# Patient Record
Sex: MALE | Race: WHITE | NOT HISPANIC OR LATINO | URBAN - METROPOLITAN AREA
[De-identification: names, ages, dates, MRNs, and addresses within clinical notes are randomized per-mention and may not be internally consistent; named-entity substitution may affect disease eponyms.]

---

## 2024-08-12 ENCOUNTER — OFFICE VISIT (OUTPATIENT)
Dept: CARDIOLOGY | Facility: CLINIC | Age: 79
End: 2024-08-12
Payer: MEDICARE

## 2024-08-12 VITALS
DIASTOLIC BLOOD PRESSURE: 88 MMHG | SYSTOLIC BLOOD PRESSURE: 122 MMHG | BODY MASS INDEX: 28.79 KG/M2 | HEART RATE: 83 BPM | WEIGHT: 190 LBS | HEIGHT: 68 IN | OXYGEN SATURATION: 95 %

## 2024-08-12 DIAGNOSIS — Z95.1 S/P CABG (CORONARY ARTERY BYPASS GRAFT): ICD-10-CM

## 2024-08-12 DIAGNOSIS — Z01.810 PREOP CARDIOVASCULAR EXAM: Primary | ICD-10-CM

## 2024-08-12 DIAGNOSIS — E78.5 HYPERLIPIDEMIA LDL GOAL <70: ICD-10-CM

## 2024-08-12 PROCEDURE — 1159F MED LIST DOCD IN RCRD: CPT | Performed by: STUDENT IN AN ORGANIZED HEALTH CARE EDUCATION/TRAINING PROGRAM

## 2024-08-12 PROCEDURE — 93000 ELECTROCARDIOGRAM COMPLETE: CPT | Performed by: STUDENT IN AN ORGANIZED HEALTH CARE EDUCATION/TRAINING PROGRAM

## 2024-08-12 PROCEDURE — 1160F RVW MEDS BY RX/DR IN RCRD: CPT | Performed by: STUDENT IN AN ORGANIZED HEALTH CARE EDUCATION/TRAINING PROGRAM

## 2024-08-12 PROCEDURE — 99204 OFFICE O/P NEW MOD 45 MIN: CPT | Performed by: STUDENT IN AN ORGANIZED HEALTH CARE EDUCATION/TRAINING PROGRAM

## 2024-08-12 RX ORDER — ALBUTEROL SULFATE 90 UG/1
1 AEROSOL, METERED RESPIRATORY (INHALATION)
COMMUNITY

## 2024-08-12 RX ORDER — FINASTERIDE 5 MG/1
1 TABLET, FILM COATED ORAL DAILY
COMMUNITY

## 2024-08-12 RX ORDER — BUDESONIDE AND FORMOTEROL FUMARATE DIHYDRATE 160; 4.5 UG/1; UG/1
2 AEROSOL RESPIRATORY (INHALATION)
COMMUNITY

## 2024-08-12 RX ORDER — ROSUVASTATIN CALCIUM 10 MG/1
10 TABLET, COATED ORAL DAILY
Qty: 30 TABLET | Refills: 11 | Status: SHIPPED | OUTPATIENT
Start: 2024-08-12

## 2024-08-12 RX ORDER — CEPHALEXIN 500 MG/1
500 CAPSULE ORAL 2 TIMES DAILY
COMMUNITY
Start: 2024-05-22

## 2024-08-12 RX ORDER — ALBUTEROL SULFATE 2.5 MG/3ML
2.5 SOLUTION RESPIRATORY (INHALATION)
COMMUNITY
Start: 2024-04-15

## 2024-08-12 NOTE — PROGRESS NOTES
"      [facility]    Subjective:     Encounter Date:08/12/24      Patient ID: Allan Hills is a 78 y.o. male.    Chief Complaint:   Chief Complaint   Patient presents with    Establish Care     Patient is in the office today to establish care and obtain an Preoperative clearance.       History of Present Illness    Allan Hills is a 78-year-old gentleman who presents for a preoperative cardiovascular risk assessment.  His PCP is Dr. Richardson.  He has a history of bladder cancer and is undergoing upcoming urologic procedures.  He also has a history of COPD as well as coronary artery disease with a suggested history of prior bypass surgery with a median sternotomy.  His records state thatThis was done in the early 2000's.  He has a history of a 5.3 cm abdominal aortic aneurysm which she has declined further surgical or evaluations for.    He does report that he has an advanced directive and is DNR.  He has \"made peace with his medical conditions\" and has \"lived a good life\".  He has denied further surgical evaluations of his AAA, and he knows that between his bladder cancer, his known heart disease, and his AAA \"something will get him.\"    He denies any chest pains, he does get short of breath when he walks.  This has been relatively stable over the last few years.  He has undergone a few different TURP procedures but he presents today for cardiac evaluation for similar procedure, as preop testing revealedAn abnormal ECG.  I do not see any prior ECGs prior to February 2024, although he did have his heart surgery at [facility] in early 2000's.    I reviewed his I reviewed his previous records from his PCP Dr. Richardson.  I reviewed his lipid panel and his TSH from July 2022 which appear to be the most recent records I have available.  His A1c was 5.7, his TSH was normal 1.2, creatinine 1.01, potassium 4.5, platelets 282, , HDL 95    He quit smoking in 2015 after his COPD diagnosis.    Previous " "Cardiac Testing:  [facility] July 2021: EF 61%, normal RV, aortic sclerosis without stenosis.    The following portions of the patient's history were reviewed and updated as appropriate: allergies, current medications, past family history, past medical history, past social history, past surgical history and problem list.    No past medical history on file.    No past surgical history on file.        ECG 12 Lead    Date/Time: 8/12/2024 11:17 AM  Performed by: Zeyad Grande MD    Authorized by: Zeyad Grande MD  Comparison: compared with previous ECG from 2/28/2024  Similar to previous ECG  Rhythm: sinus rhythm  Rate: normal  T inversion: V3 and V2  T flattening: V4  QRS axis: normal  Other findings: non-specific ST-T wave changes and early transition    Clinical impression: non-specific ECG           Objective:     Vitals:    08/12/24 1105   BP: 122/88   BP Location: Left arm   Patient Position: Sitting   Pulse: 83   SpO2: 95%   Weight: 86.2 kg (190 lb)   Height: 172.7 cm (68\")         Constitutional:       Appearance: Healthy appearance. Not in distress.   Neck:      Vascular: JVD normal.   Pulmonary:      Effort: Pulmonary effort is normal.      Comments: Poor air movement diffusely with scant expiratory wheezes.  No rales.  Cardiovascular:      PMI at left midclavicular line. Normal rate. Regular rhythm. Normal S2.       Murmurs: There is no murmur.   Pulses:     Intact distal pulses.   Edema:     Peripheral edema present.     Ankle: bilateral trace edema of the ankle.  Skin:     General: Skin is warm and dry.   Neurological:      General: No focal deficit present.      Mental Status: Alert, oriented to person, place, and time and oriented to person, place and time.   Psychiatric:         Mood and Affect: Mood and affect normal.         Lab Review:       BUN   Date Value Ref Range Status   01/04/2023 11 8 - 26 mg/dL Final     Creatinine   Date Value Ref Range Status   01/04/2023 1.10 0.73 - 1.18 mg/dL " "Final     Potassium   Date Value Ref Range Status   01/04/2023 3.9 3.5 - 5.1 mmol/L Final     ALT (SGPT)   Date Value Ref Range Status   07/26/2021 15 0 - 55 U/L Final     AST (SGOT)   Date Value Ref Range Status   07/26/2021 10 5 - 34 U/L Final         Performed        Assessment:          Diagnosis Plan   1. Preop cardiovascular exam               Plan:         AAA: The patient has declined further evaluation and monitoring per his PCP.  Most recently was 5.3 cm.  Preoperative cardiovascular assessment for upcoming TURP: The patient already had a procedure similar to this in February of this year, as well as October of last year.  He is scheduled for another procedure on September 18.  This is a relatively low risk procedure for preoperative M ACE, and he does not meet guideline recommendations for any additional cardiac testing for this.  That being said, given his risk factors including prior CABG, vascular risk factors, he is probably at moderate risk for preoperative M ACE, however this is not modifiable.  I do think an echocardiogram is reasonable, I do not see this bearing much on his surgical risk, however has been a few years since his last echo, he does have some trace pitting edema of his legs and you could argue that any findings of CHF can be treated medically which would help his quality of life which is his main interest.  He has continued to decline a lot of invasive testing and surgeries including AAA repair or additional cardiac procedures I do not see the role for stress testing at this time, we discussed medical therapy for certain conditions and I think that an echocardiogram here is reasonable.  Hyperlipidemia: Patient states that he had \"trouble\" with statin medications before but does not ever recall being on Crestor.  Will start Crestor 10 and take it at night, and then decrease to every other day if he has symptoms of myalgias.  I do think that this is highly warranted here given his " previous history of bypass surgery as well as his abdominal aneurysm  Papillary urothelial carcinoma.  He follows with urology.  Trace ankle pitting edema.  Checking the echo per above.  Probably venous insufficiency related.  No rales on exam to suggest CHF      Thank you for allowing me to participate in the care of Allan Hills. Feel free to contact me directly with any further questions or concerns.    RTC 1 year for preventative therapy reassessment, sooner if the echo shows any significant abnormalities.    Zeyad Grande MD  [facility]  08/12/24  08:59 EDT       Current Outpatient Medications:     albuterol (PROVENTIL) (2.5 MG/3ML) 0.083% nebulizer solution, Take 2.5 mg by nebulization 4 (Four) Times a Day., Disp: , Rfl:     albuterol sulfate  (90 Base) MCG/ACT inhaler, Inhale 1 puff 4 (Four) Times a Day., Disp: , Rfl:     budesonide-formoterol (SYMBICORT) 160-4.5 MCG/ACT inhaler, Inhale 2 puffs 2 (Two) Times a Day., Disp: , Rfl:     cephalexin (KEFLEX) 500 MG capsule, Take 1 capsule by mouth 2 (Two) Times a Day., Disp: , Rfl:     finasteride (PROSCAR) 5 MG tablet, Take 1 tablet by mouth Daily., Disp: , Rfl:          No follow-ups on file.      Part of this note may be an electronic transcription/translation of spoken language to printed text using the Dragon Dictation System.

## 2024-08-12 NOTE — PATIENT INSTRUCTIONS
Lets start the rosuvastatin 10mg, at night. If you have cramping, try to take it every other day, some is better than none as it reduces vascular inflammation

## 2024-08-28 ENCOUNTER — HOSPITAL ENCOUNTER (OUTPATIENT)
Dept: CARDIOLOGY | Facility: HOSPITAL | Age: 79
Discharge: HOME | End: 2024-08-28
Admitting: STUDENT IN AN ORGANIZED HEALTH CARE EDUCATION/TRAINING PROGRAM
Payer: MEDICARE

## 2024-08-28 VITALS
WEIGHT: 190 LBS | BODY MASS INDEX: 28.79 KG/M2 | SYSTOLIC BLOOD PRESSURE: 104 MMHG | DIASTOLIC BLOOD PRESSURE: 68 MMHG | HEIGHT: 68 IN

## 2024-08-28 DIAGNOSIS — Z95.1 S/P CABG (CORONARY ARTERY BYPASS GRAFT): ICD-10-CM

## 2024-08-28 DIAGNOSIS — Z01.810 PREOP CARDIOVASCULAR EXAM: ICD-10-CM

## 2024-08-28 LAB
AORTIC DIMENSIONLESS INDEX: 0.7 (DI)
BH CV ECHO MEAS - ACS: 1.45 CM
BH CV ECHO MEAS - AI P1/2T: 892.1 MSEC
BH CV ECHO MEAS - AO MAX PG: 5.8 MMHG
BH CV ECHO MEAS - AO MEAN PG: 3.3 MMHG
BH CV ECHO MEAS - AO ROOT DIAM: 4 CM
BH CV ECHO MEAS - AO V2 MAX: 120 CM/SEC
BH CV ECHO MEAS - AO V2 VTI: 24.8 CM
BH CV ECHO MEAS - AVA(I,D): 3.2 CM2
BH CV ECHO MEAS - EDV(CUBED): 50 ML
BH CV ECHO MEAS - EDV(MOD-SP2): 54 ML
BH CV ECHO MEAS - EDV(MOD-SP4): 57 ML
BH CV ECHO MEAS - EF(MOD-BP): 58.7 %
BH CV ECHO MEAS - EF(MOD-SP2): 57.4 %
BH CV ECHO MEAS - EF(MOD-SP4): 59.6 %
BH CV ECHO MEAS - ESV(CUBED): 19.3 ML
BH CV ECHO MEAS - ESV(MOD-SP2): 23 ML
BH CV ECHO MEAS - ESV(MOD-SP4): 23 ML
BH CV ECHO MEAS - FS: 27.2 %
BH CV ECHO MEAS - IVS/LVPW: 0.97 CM
BH CV ECHO MEAS - IVSD: 0.98 CM
BH CV ECHO MEAS - LAT PEAK E' VEL: 7.4 CM/SEC
BH CV ECHO MEAS - LV MASS(C)D: 111.4 GRAMS
BH CV ECHO MEAS - LV MAX PG: 3.7 MMHG
BH CV ECHO MEAS - LV MEAN PG: 1.94 MMHG
BH CV ECHO MEAS - LV V1 MAX: 95.7 CM/SEC
BH CV ECHO MEAS - LV V1 VTI: 17.8 CM
BH CV ECHO MEAS - LVIDD: 3.7 CM
BH CV ECHO MEAS - LVIDS: 2.7 CM
BH CV ECHO MEAS - LVOT AREA: 4.4 CM2
BH CV ECHO MEAS - LVOT DIAM: 2.37 CM
BH CV ECHO MEAS - LVPWD: 1.01 CM
BH CV ECHO MEAS - MED PEAK E' VEL: 5.2 CM/SEC
BH CV ECHO MEAS - MV A DUR: 0.1 SEC
BH CV ECHO MEAS - MV A MAX VEL: 76.6 CM/SEC
BH CV ECHO MEAS - MV DEC SLOPE: 160.3 CM/SEC2
BH CV ECHO MEAS - MV DEC TIME: 0.28 SEC
BH CV ECHO MEAS - MV E MAX VEL: 47.6 CM/SEC
BH CV ECHO MEAS - MV E/A: 0.62
BH CV ECHO MEAS - MV MAX PG: 2.9 MMHG
BH CV ECHO MEAS - MV MEAN PG: 1 MMHG
BH CV ECHO MEAS - MV P1/2T: 101.2 MSEC
BH CV ECHO MEAS - MV V2 VTI: 20.2 CM
BH CV ECHO MEAS - MVA(P1/2T): 2.17 CM2
BH CV ECHO MEAS - MVA(VTI): 3.9 CM2
BH CV ECHO MEAS - PA ACC TIME: 0.11 SEC
BH CV ECHO MEAS - PA V2 MAX: 106.9 CM/SEC
BH CV ECHO MEAS - RAP SYSTOLE: 3 MMHG
BH CV ECHO MEAS - RV MAX PG: 2.8 MMHG
BH CV ECHO MEAS - RV V1 MAX: 83.1 CM/SEC
BH CV ECHO MEAS - RV V1 VTI: 15.9 CM
BH CV ECHO MEAS - SV(LVOT): 78.4 ML
BH CV ECHO MEAS - SV(MOD-SP2): 31 ML
BH CV ECHO MEAS - SV(MOD-SP4): 34 ML
BH CV ECHO MEAS - TAPSE (>1.6): 1.47 CM
BH CV ECHO MEASUREMENTS AVERAGE E/E' RATIO: 7.56
BH CV XLRA - RV BASE: 3.1 CM
BH CV XLRA - RV LENGTH: 6.2 CM
BH CV XLRA - RV MID: 1.94 CM
BH CV XLRA - TDI S': 8.9 CM/SEC
LEFT ATRIUM VOLUME INDEX: 16.9 ML/M2
SINUS: 3.1 CM
STJ: 2.8 CM

## 2024-08-28 PROCEDURE — 93306 TTE W/DOPPLER COMPLETE: CPT

## 2024-08-28 NOTE — PROGRESS NOTES
Please let patient know that his ultrasound of his heart rate does not show any findings of heart failure.  There is a mild valve leak of one of his heart valves, likely related to age, but this is not going to cause any symptoms, nor will it probably cause any problems in the future.    Can we follow-up with patient and see if he is able to take that Crestor medication?  We had discussed this in clinic.  Thank you very much.

## 2024-08-29 ENCOUNTER — TELEPHONE (OUTPATIENT)
Dept: CARDIOLOGY | Facility: CLINIC | Age: 79
End: 2024-08-29
Payer: MEDICARE

## 2024-08-29 NOTE — TELEPHONE ENCOUNTER
----- Message from Zeyad Grande sent at 8/28/2024  4:11 PM EDT -----  Please let patient know that his ultrasound of his heart rate does not show any findings of heart failure.  There is a mild valve leak of one of his heart valves, likely related to age, but this is not going to cause any symptoms, nor will it probably cause any problems in the future.    Can we follow-up with patient and see if he is able to take that Crestor medication?  We had discussed this in clinic.  Thank you very much.

## 2024-08-29 NOTE — TELEPHONE ENCOUNTER
Called and left VM, will continue to try to reach pt.    HUB- please put patient straight through to triage    Carol Gonzalez, FRAN  Triage RN  08/29/24 09:01 EDT

## 2024-08-29 NOTE — TELEPHONE ENCOUNTER
Pt called back. Went over results and recommendations he verbalized understanding.    Dr. Grande,    Pt said he is taking Crestor. He has been on it for a week and he has not had any side effects so far.    Thank you,    Kimmy Angel, FRAN  Triage [facility]  08/29/24 13:19 EDT

## 2024-09-11 DIAGNOSIS — J44.0 COPD WITH LOWER RESPIRATORY INFECTION: Primary | ICD-10-CM

## 2024-09-11 DIAGNOSIS — R35.1 BPH ASSOCIATED WITH NOCTURIA: ICD-10-CM

## 2024-09-11 DIAGNOSIS — N40.1 BPH ASSOCIATED WITH NOCTURIA: ICD-10-CM

## 2024-09-11 RX ORDER — CEPHALEXIN 500 MG/1
CAPSULE ORAL
Qty: 60 CAPSULE | Refills: 3 | Status: SHIPPED | OUTPATIENT
Start: 2024-09-11

## 2024-09-11 RX ORDER — ALBUTEROL SULFATE 90 UG/1
AEROSOL, METERED RESPIRATORY (INHALATION)
Qty: 6.7 G | Refills: 11 | Status: SHIPPED | OUTPATIENT
Start: 2024-09-11

## 2024-09-11 RX ORDER — FINASTERIDE 5 MG/1
TABLET, FILM COATED ORAL DAILY
Qty: 90 TABLET | Refills: 3 | Status: SHIPPED | OUTPATIENT
Start: 2024-09-11

## 2024-12-18 ENCOUNTER — OFFICE VISIT (OUTPATIENT)
Age: 79
End: 2024-12-18
Payer: MEDICARE

## 2024-12-18 VITALS
HEART RATE: 70 BPM | RESPIRATION RATE: 14 BRPM | OXYGEN SATURATION: 94 % | HEIGHT: 68 IN | DIASTOLIC BLOOD PRESSURE: 78 MMHG | SYSTOLIC BLOOD PRESSURE: 130 MMHG | TEMPERATURE: 98 F | WEIGHT: 183 LBS | BODY MASS INDEX: 27.74 KG/M2

## 2024-12-18 DIAGNOSIS — C67.3 MALIGNANT NEOPLASM OF ANTERIOR WALL OF URINARY BLADDER: ICD-10-CM

## 2024-12-18 DIAGNOSIS — I25.10 CORONARY ARTERIOSCLEROSIS: ICD-10-CM

## 2024-12-18 DIAGNOSIS — I71.41 PARARENAL ABDOMINAL AORTIC ANEURYSM (AAA) WITHOUT RUPTURE: ICD-10-CM

## 2024-12-18 DIAGNOSIS — R35.1 BPH ASSOCIATED WITH NOCTURIA: ICD-10-CM

## 2024-12-18 DIAGNOSIS — J43.1 PANLOBULAR EMPHYSEMA: ICD-10-CM

## 2024-12-18 DIAGNOSIS — Z11.59 NEED FOR HEPATITIS C SCREENING TEST: ICD-10-CM

## 2024-12-18 DIAGNOSIS — E78.2 MIXED HYPERLIPIDEMIA: ICD-10-CM

## 2024-12-18 DIAGNOSIS — Z00.00 MEDICARE ANNUAL WELLNESS VISIT, SUBSEQUENT: Primary | ICD-10-CM

## 2024-12-18 DIAGNOSIS — N40.1 BPH ASSOCIATED WITH NOCTURIA: ICD-10-CM

## 2024-12-18 DIAGNOSIS — Z87.891 HISTORY OF SMOKING: ICD-10-CM

## 2024-12-18 DIAGNOSIS — Z13.1 SCREENING FOR DIABETES MELLITUS: ICD-10-CM

## 2024-12-18 DIAGNOSIS — E88.09 HYPERALBUMINEMIA: ICD-10-CM

## 2024-12-18 DIAGNOSIS — J44.0 COPD WITH LOWER RESPIRATORY INFECTION: ICD-10-CM

## 2024-12-18 PROBLEM — J44.1 CHRONIC OBSTRUCTIVE PULMONARY DISEASE WITH ACUTE EXACERBATION: Status: ACTIVE | Noted: 2021-07-09

## 2024-12-18 PROBLEM — I71.40 ABDOMINAL AORTIC ANEURYSM: Status: ACTIVE | Noted: 2023-08-29

## 2024-12-18 PROBLEM — N40.0 BENIGN PROSTATIC HYPERPLASIA WITHOUT URINARY OBSTRUCTION: Status: ACTIVE | Noted: 2024-12-18

## 2024-12-18 PROCEDURE — 1126F AMNT PAIN NOTED NONE PRSNT: CPT | Performed by: FAMILY MEDICINE

## 2024-12-18 PROCEDURE — 1159F MED LIST DOCD IN RCRD: CPT | Performed by: FAMILY MEDICINE

## 2024-12-18 PROCEDURE — 1160F RVW MEDS BY RX/DR IN RCRD: CPT | Performed by: FAMILY MEDICINE

## 2024-12-18 PROCEDURE — 1170F FXNL STATUS ASSESSED: CPT | Performed by: FAMILY MEDICINE

## 2024-12-18 PROCEDURE — G0439 PPPS, SUBSEQ VISIT: HCPCS | Performed by: FAMILY MEDICINE

## 2024-12-18 RX ORDER — PREDNISONE 10 MG/1
TABLET ORAL
Qty: 42 TABLET | Refills: 3 | Status: SHIPPED | OUTPATIENT
Start: 2024-12-18

## 2024-12-18 RX ORDER — CEPHALEXIN 500 MG/1
500 CAPSULE ORAL 4 TIMES DAILY
Qty: 60 CAPSULE | Refills: 3 | Status: SHIPPED | OUTPATIENT
Start: 2024-12-18

## 2024-12-18 RX ORDER — FINASTERIDE 5 MG/1
5 TABLET, FILM COATED ORAL DAILY
Qty: 90 TABLET | Refills: 3 | Status: SHIPPED | OUTPATIENT
Start: 2024-12-18

## 2024-12-18 NOTE — PROGRESS NOTES
Subjective   The ABCs of the Annual Wellness Visit  Medicare Wellness Visit      Allan Hills is a 79 y.o. patient who presents for a Medicare Wellness Visit.    The following portions of the patient's history were reviewed and   updated as appropriate: allergies, current medications, past family history, past medical history, past social history, past surgical history, and problem list.    Compared to one year ago, the patient's physical   health is the same.  Compared to one year ago, the patient's mental   health is the same.    Recent Hospitalizations:  He was not admitted to the hospital during the last year.     Current Medical Providers:  Patient Care Team:  Rohan Richardson MD as PCP - General (Family Medicine)    Outpatient Medications Prior to Visit   Medication Sig Dispense Refill    albuterol (PROVENTIL) (2.5 MG/3ML) 0.083% nebulizer solution Take 2.5 mg by nebulization 4 (Four) Times a Day.      albuterol sulfate  (90 Base) MCG/ACT inhaler INHALE 2 PUFFS BY MOUTH EVERY 4 HOURS 6.7 g 11    budesonide-formoterol (SYMBICORT) 160-4.5 MCG/ACT inhaler Inhale 2 puffs 2 (Two) Times a Day.      finasteride (PROSCAR) 5 MG tablet TAKE 1 TABLET BY MOUTH DAILY 90 tablet 3    cephalexin (KEFLEX) 500 MG capsule TAKE 1 CAPSULE BY MOUTH 4 TIMES A DAY (Patient not taking: Reported on 12/18/2024) 60 capsule 3    rosuvastatin (CRESTOR) 10 MG tablet Take 1 tablet by mouth Daily. (Patient not taking: Reported on 12/18/2024) 30 tablet 11     No facility-administered medications prior to visit.     No opioid medication identified on active medication list. I have reviewed chart for other potential  high risk medication/s and harmful drug interactions in the elderly.      Aspirin is not on active medication list.  Aspirin use is indicated based on review of current medical condition/s. Pros and cons of this therapy have been discussed with this patient. Benefits of this medication outweigh potential harm.  Patient  "has been instructed to start taking this medication..    Patient Active Problem List   Diagnosis    Medicare annual wellness visit, subsequent    Mixed hyperlipidemia    Malignant neoplasm of anterior wall of urinary bladder    Abdominal aortic aneurysm    Benign prostatic hyperplasia without urinary obstruction    Chronic obstructive pulmonary disease with acute exacerbation    Coronary arteriosclerosis    Hyperalbuminemia    History of smoking     Advance Care Planning Advance Directive is not on file.  ACP discussion was held with the patient during this visit. Patient has an advance directive (not in EMR), copy requested.            Objective   Vitals:    12/18/24 1021   BP: 130/78   BP Location: Left arm   Patient Position: Sitting   Cuff Size: Adult   Pulse: 70   Resp: 14   Temp: 98 °F (36.7 °C)   TempSrc: Temporal   SpO2: 94%   Weight: 83 kg (183 lb)   Height: 172.7 cm (67.99\")   PainSc: 0-No pain       Estimated body mass index is 27.83 kg/m² as calculated from the following:    Height as of this encounter: 172.7 cm (67.99\").    Weight as of this encounter: 83 kg (183 lb).    BMI is >= 25 and <30. (Overweight) The following options were offered after discussion;: weight loss educational material (shared in after visit summary)           Does the patient have evidence of cognitive impairment? No                                                                                               Health  Risk Assessment    Smoking Status:  Social History     Tobacco Use   Smoking Status Former    Current packs/day: 0.00    Average packs/day: 2.0 packs/day for 54.0 years (108.0 ttl pk-yrs)    Types: Cigarettes    Start date: 1960    Quit date: 2014    Years since quitting: 10.9    Passive exposure: Past   Smokeless Tobacco Never     Alcohol Consumption:  Social History     Substance and Sexual Activity   Alcohol Use Not Currently       Fall Risk Screen  STEADI Fall Risk Assessment was completed, and patient is at LOW " risk for falls.Assessment completed on:2024    Depression Screening   Little interest or pleasure in doing things? Not at all   Feeling down, depressed, or hopeless? Not at all   PHQ-2 Total Score 0      Health Habits and Functional and Cognitive Screenin/18/2024    10:24 AM   Functional & Cognitive Status   Do you have difficulty preparing food and eating? No   Do you have difficulty bathing yourself, getting dressed or grooming yourself? No   Do you have difficulty using the toilet? No   Do you have difficulty moving around from place to place? No   Do you have trouble with steps or getting out of a bed or a chair? No   Current Diet Well Balanced Diet   Dental Exam Up to date   Eye Exam Not up to date   Exercise (times per week) 0 times per week   Current Exercises Include No Regular Exercise   Do you need help using the phone?  No   Are you deaf or do you have serious difficulty hearing?  No   Do you need help to go to places out of walking distance? No   Do you need help shopping? No   Do you need help preparing meals?  No   Do you need help with housework?  No   Do you need help with laundry? No   Do you need help taking your medications? No   Do you need help managing money? No   Do you ever drive or ride in a car without wearing a seat belt? No   Have you felt unusual stress, anger or loneliness in the last month? No   Who do you live with? Spouse   If you need help, do you have trouble finding someone available to you? No   Have you been bothered in the last four weeks by sexual problems? No   Do you have difficulty concentrating, remembering or making decisions? No           Age-appropriate Screening Schedule:  Refer to the list below for future screening recommendations based on patient's age, sex and/or medical conditions. Orders for these recommended tests are listed in the plan section. The patient has been provided with a written plan.    Health Maintenance List  Health Maintenance    Topic Date Due    LIPID PANEL  Never done    BMI FOLLOWUP  Never done    LUNG CANCER SCREENING  Never done    HEPATITIS C SCREENING  Never done    ZOSTER VACCINE (1 of 2) 12/18/2024 (Originally 8/30/1997)    COVID-19 Vaccine (6 - 2024-25 season) 12/20/2024 (Originally 9/1/2024)    INFLUENZA VACCINE  03/31/2025 (Originally 7/1/2024)    RSV Vaccine - Adults (1 - 1-dose 75+ series) 12/18/2025 (Originally 8/30/2022)    Pneumococcal Vaccine 65+ (1 of 2 - PCV) 12/18/2025 (Originally 8/30/1951)    TDAP/TD VACCINES (1 - Tdap) 12/18/2025 (Originally 8/30/1966)    ANNUAL WELLNESS VISIT  12/18/2025                                                                                                                                                CMS Preventative Services Quick Reference  Risk Factors Identified During Encounter  Immunizations Discussed/Encouraged: Influenza and COVID19    The above risks/problems have been discussed with the patient.  Pertinent information has been shared with the patient in the After Visit Summary.  An After Visit Summary and PPPS were made available to the patient.    Follow Up:   Next Medicare Wellness visit to be scheduled in 1 year.     Assessment & Plan  Need for hepatitis C screening test    Orders:    Hepatitis C Antibody    Medicare annual wellness visit, subsequent    Orders:    Basic Metabolic Panel    CBC & Differential    Hepatic Function Panel (6) ([facility])    Hemoglobin A1c    Lipid Panel    Hepatitis C Antibody    Mixed hyperlipidemia       Orders:    Hepatic Function Panel (6) ([facility])    Lipid Panel    Screening for diabetes mellitus    Orders:    Basic Metabolic Panel    Hemoglobin A1c    History of smoking    Orders:    CT Chest Low Dose Wo; Future    Malignant neoplasm of anterior wall of urinary bladder         Panlobular emphysema      Orders:    predniSONE (DELTASONE) 10 MG tablet; 6pox2d/5pox2d/4pox2d/3pox2d/2pox2d/1pox2d    Coronary arteriosclerosis            Hyperalbuminemia         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    COPD with lower respiratory infection      Orders:    cephalexin (KEFLEX) 500 MG capsule; Take 1 capsule by mouth 4 (Four) Times a Day.         Follow Up:   No follow-ups on file.

## 2024-12-18 NOTE — ASSESSMENT & PLAN NOTE
Orders:    Basic Metabolic Panel    CBC & Differential    Hepatic Function Panel (6) ([facility])    Hemoglobin A1c    Lipid Panel    Hepatitis C Antibody

## 2025-01-02 ENCOUNTER — TELEPHONE (OUTPATIENT)
Age: 80
End: 2025-01-02
Payer: MEDICARE

## 2025-01-31 ENCOUNTER — TELEPHONE (OUTPATIENT)
Age: 80
End: 2025-01-31
Payer: MEDICARE

## 2025-02-03 DIAGNOSIS — Z76.0 REPEAT PRESCRIPTION ISSUE: Primary | ICD-10-CM

## 2025-02-03 RX ORDER — BUDESONIDE AND FORMOTEROL FUMARATE DIHYDRATE 160; 4.5 UG/1; UG/1
2 AEROSOL RESPIRATORY (INHALATION)
Qty: 30.6 G | Refills: 2 | Status: SHIPPED | OUTPATIENT
Start: 2025-02-03 | End: 2025-02-04 | Stop reason: SDUPTHER

## 2025-02-04 RX ORDER — BUDESONIDE AND FORMOTEROL FUMARATE DIHYDRATE 160; 4.5 UG/1; UG/1
2 AEROSOL RESPIRATORY (INHALATION)
Qty: 30.6 G | Refills: 2 | Status: SHIPPED | OUTPATIENT
Start: 2025-02-04